# Patient Record
Sex: MALE | Race: WHITE | NOT HISPANIC OR LATINO | Employment: OTHER | URBAN - METROPOLITAN AREA
[De-identification: names, ages, dates, MRNs, and addresses within clinical notes are randomized per-mention and may not be internally consistent; named-entity substitution may affect disease eponyms.]

---

## 2017-09-07 ENCOUNTER — HOSPITAL ENCOUNTER (EMERGENCY)
Facility: CLINIC | Age: 62
Discharge: HOME | End: 2017-09-07
Attending: PHYSICIAN ASSISTANT | Admitting: PHYSICIAN ASSISTANT
Payer: COMMERCIAL

## 2017-09-07 ENCOUNTER — APPOINTMENT (OUTPATIENT)
Dept: GENERAL RADIOLOGY | Facility: CLINIC | Age: 62
End: 2017-09-07
Attending: PHYSICIAN ASSISTANT
Payer: COMMERCIAL

## 2017-09-07 VITALS
SYSTOLIC BLOOD PRESSURE: 114 MMHG | TEMPERATURE: 98.9 F | RESPIRATION RATE: 16 BRPM | HEART RATE: 64 BPM | OXYGEN SATURATION: 96 % | DIASTOLIC BLOOD PRESSURE: 62 MMHG

## 2017-09-07 DIAGNOSIS — S61.209A FINGER AVULSION, INITIAL ENCOUNTER: ICD-10-CM

## 2017-09-07 PROCEDURE — 64450 NJX AA&/STRD OTHER PN/BRANCH: CPT

## 2017-09-07 PROCEDURE — 99283 EMERGENCY DEPT VISIT LOW MDM: CPT | Mod: 25

## 2017-09-07 PROCEDURE — 25000125 ZZHC RX 250: Performed by: EMERGENCY MEDICINE

## 2017-09-07 PROCEDURE — 73140 X-RAY EXAM OF FINGER(S): CPT | Mod: LT

## 2017-09-07 RX ORDER — LIDOCAINE HYDROCHLORIDE 10 MG/ML
INJECTION, SOLUTION INFILTRATION; PERINEURAL
Status: DISCONTINUED
Start: 2017-09-07 | End: 2017-09-07 | Stop reason: HOSPADM

## 2017-09-07 RX ORDER — ONDANSETRON 4 MG/1
4 TABLET, ORALLY DISINTEGRATING ORAL ONCE
Status: COMPLETED | OUTPATIENT
Start: 2017-09-07 | End: 2017-09-07

## 2017-09-07 RX ADMIN — ONDANSETRON 4 MG: 4 TABLET, ORALLY DISINTEGRATING ORAL at 12:33

## 2017-09-07 ASSESSMENT — ENCOUNTER SYMPTOMS
LIGHT-HEADEDNESS: 0
FEVER: 0
NUMBNESS: 0
CHILLS: 0
WOUND: 1
MYALGIAS: 1

## 2017-09-07 NOTE — ED AVS SNAPSHOT
[facility] Emergency Department    [address]    [facility] [address]    Phone:  [phone]    Fax:  [phone]                                       Ruiz Ortiz   MRN: [id number]    Department:  [facility] Emergency Department   Date of Visit:  9/7/2017           After Visit Summary Signature Page     I have received my discharge instructions, and my questions have been answered. I have discussed any challenges I see with this plan with the nurse or doctor.    ..........................................................................................................................................  Patient/Patient Representative Signature      ..........................................................................................................................................  Patient Representative Print Name and Relationship to Patient    ..................................................               ................................................  Date                                            Time    ..........................................................................................................................................  Reviewed by Signature/Title    ...................................................              ..............................................  Date                                                            Time

## 2017-09-07 NOTE — ED PROVIDER NOTES
History     Chief Complaint:  Finger Laceration     HPI   Ruiz Ortiz is a 62 year old male who presents to the ED for evaluation of finger laceration. The patient reports he was using a table saw today when the blade cut the 3rd digit tip of his left hand. The patient notes he has pain but it is only a 4/10. The patient reports he took Tylenol. The patient denies any fevers, chills, lightheadedness, numbness, or tingling. Of note, the patient's last tetanus was on 4/21/17.    Allergies:  Vicodin     Medications:    Acetaminophen (TYLENOL PO)   IBUPROFEN PO   OXYCODONE HCL PO   Pantoprazole Sodium (PROTONIX PO)     Past Medical History:    Colon cancer    Past Surgical History:    Colon surgery  GI surgery    Family History:    History reviewed. No pertinent family history.     Social History:  Smoking status: Former smoker  Presents to ED alone  Marital Status:   [2]     Review of Systems   Constitutional: Negative for chills and fever.   Musculoskeletal: Positive for myalgias.   Skin: Positive for wound.   Neurological: Negative for light-headedness and numbness.   All other systems reviewed and are negative.    Physical Exam      Patient Vitals for the past 24 hrs:   Temp Temp src Heart Rate Resp SpO2   09/07/17 1214 98.9  F (37.2  C) Temporal 65 16 96 %     Physical Exam  Constitutional:  Alert, attentive  Cardiovascular:  2+ radial and ulnar pulses to the bilateral upper extremities   Neurological:  5/5 strength to the radian, ulnar and median motor distributions;      sensation intact to light touch to the radian, ulnar and median distributions  MSK:   There is a stellate avulsion to the palmar dorsal aspect of the left 3rd finger without involvement of the nail. Normal ROM of the finger.   Skin:    Skin is warm and dry.      Emergency Department Course     Imaging:  Radiographic findings were communicated with the patient who voiced understanding of the findings.    Fingers XR, 2-3 Views,  Left  IMPRESSION: No acute osseous abnormality. As read by Radiology.    Procedures:      Narrative: Procedure: Laceration Repair        LACERATION:  A stellate clean avulsion      LOCATION:  Tip of 3rd digit, left hand      FUNCTION:  Distally sensation, circulation and motor are intact.      ANESTHESIA:  Digital block using Lidocaine 1% total of 2 mLs      PREPARATION:  Irrigation and Scrubbing with Normal Saline and Shur Clens      DEBRIDEMENT:  no debridement      CLOSURE:  No closure necessary. The wound was an avulsion and gel foam was applied with tube gauze and optional finger splint provided by ED tech.      Interventions:  1233: Zofran 4mg Oral  Lidocaine 1% without epi    Emergency Department Course:  Past medical records, nursing notes, and vitals reviewed.  1214: I performed an exam of the patient and obtained history, as documented above.    The patient was sent for a left finger x-ray while in the emergency department, findings above.    1235: I performed the laceration repair procedure as noted above.    1258: I rechecked the patient. Findings and plan explained to the Patient. Patient discharged home with instructions regarding supportive care, medications, and reasons to return. The importance of close follow-up was reviewed.     Impression & Plan      Medical Decision Making:  Ruiz Ortiz is a 62 year old male who presents for evaluation of a laceration to the left hand 3rd digit.  The wound was carefully evaluated and explored after digital block. The patient felt nauseated during the digital block therefore was given Zofran and water. He felt improved shortly after. There is no evidence of muscular, tendon, or bony damage with this laceration.  No signs of foreign body. X-ray was obtained given electrical saw although wound didn't appear very deep and this was negative for bony abnormalities. Possible complications (infection, scarring) were reviewed with the patient.  Follow up with primary  care as noted in the discharge section.    Diagnosis:    ICD-10-CM   1. Finger avulsion, initial encounter S61.209A     Disposition: Patient discharged to home    Salena Ortiz  9/7/2017   [facility] EMERGENCY DEPARTMENT     I, Salena Ortiz, am serving as a scribe at 12:14 PM on 9/7/2017 to document services personally performed by Teri Terrazas PA-C based on my observations and the provider's statements to me.          Teri Terrazas PA-C  09/07/17 134

## 2017-09-07 NOTE — ED AVS SNAPSHOT
[facility] Emergency Department    [address] [facility] [address]    Phone:  [phone]    Fax:  [phone]                                       Ruiz Ortiz   MRN: [id number]    Department:  [facility] Emergency Department   Date of Visit:  9/7/2017           Patient Information     Date Of Birth          1955        Your diagnoses for this visit were:     Finger avulsion, initial encounter        You were seen by Teri Terrazas PA-C.      Follow-up Information     Follow up with Amrit Sanders In 2 days.    Specialty:  Internal Medicine    Why:  As needed    Contact information:    [facility]  [address] [place] [address]  [phone]          Follow up with [facility] Emergency Department.    Specialty:  EMERGENCY MEDICINE    Why:  If symptoms worsen    Contact information:    [address] [facility] [id number] [phone]        Discharge Instructions         Skin Tear (Skin Avulsion)  A skin avulsion is a tearing of the top layer of skin. This commonly happens after a fall or other injury. It also tends to be more common in older people, or those taking blood thinners or steroids for long periods of time.  Home care  These guidelines will help you care for your wound at home:    Keep the wound clean and dry for the first 24 to 48 hours, or as your healthcare provider advises.    If there is a dressing or bandage, change it when it gets wet or dirty. Otherwise, leave it on for the first 24 hours, then change it once a day or as often as the doctor says.    If stitches or staples were used, check the wound every day.    After taking off the dressing, wash the area gently with soap and water. Clean as close to the stitches as you can. Avoid washing or rubbing the stitches directly.    After 3 days you can keep the bandages off the wound, unless told otherwise, or there is  continued drainage.  Allow the wound to be open to the air.    Keep a thin layer of antibiotic ointment on the cut. This will keep the wound clean, make it easier to remove the stitches, and reduce scarring.    If your wound is oozing, you can put a nonstick dressing over it. Then, reapply the bandage or dressing as you were told.    You can shower as usual after the first 24 hours, but don't soak the area in water (no baths or swimming) until the stitches or staples are taken out.    If surgical tape was used, keep the area clean and dry. If it becomes wet, blot it dry with a clean towel.    If skin glue was used, don't put any creams, lotions, or antibiotic ointments on it. These can dissolve the glue. Usually the glue will flake off in about 5 to 10 days by itself. Try to resist picking it off before that so the wound doesn't open up. When it gets wet, pat it dry.  Here is some information about medicine:    You may use over-the-counter medicine such as acetaminophen or ibuprofen to control pain, unless another pain medicine was given. If you have chronic liver or kidney disease or ever had a stomach ulcer or gastrointestinal bleeding, talk with your doctor before using these medicines.    If you were given antibiotics, take them until they are all used up. It is important to finish the antibiotics even if the wound looks better. This will ensure that the infection has cleared.  Follow-up care  Follow up with your healthcare provider, or as advised.    Watch for any signs of infection, such as increasing redness, swelling, or pus coming out. If this happens, don't wait for your scheduled visit. Instead, see a doctor sooner.    Stitches or staples are usually taken out within 5 to 14 days. This varies depending on what part of your body they are on, and the type of wound. The doctor will tell you how long stitches should be left in.     If surgical tape was used, it is usually left on for 7 to 10 days. You can  remove surgical tape after that unless you were told otherwise. If you try to remove it, and it is too hard, soaking can help. Surgical tape strips will eventually fall off on their own. If the edges of the cut pull apart, stop removing the tape or strips and follow up with your doctor    As mentioned above, skin glue will flake off by itself in 5 to 10 days, so you don't need to pull it off.  If any X-rays were done, you will be notified of any changes that may affect your care.  When to seek medical advice  Call your healthcare provider right away if any of these occur:    Increasing pain in the wound    Redness, swelling, or pus coming from the wound    Fever of 100.4 F (38 C) or higher, or as directed by your healthcare provider    Sutures or staples come apart or fall out before your next appointment and the wound edges look as if they will re-open    Surgical tape closures fall off before 7 days, and the wound edges look as if they will re-open    Bleeding not controlled by direct pressure  Date Last Reviewed: 9/1/2016 2000-2017 [employer]. [address] [place] [address]. All rights reserved. This information is not intended as a substitute for professional medical care. Always follow your healthcare professional's instructions.          24 Hour Appointment Hotline       To make an appointment at any [facility], call [phone] ([phone]). If you don't have a family doctor or clinic, we will help you find one. [facility] are conveniently located to serve the needs of you and your family.             Review of your medicines      Our records show that you are taking the medicines listed below. If these are incorrect, please call your family doctor or clinic.        Dose / Directions Last dose taken    IBUPROFEN PO        Refills:  0        OXYCODONE HCL PO        Refills:  0        PROTONIX PO        Refills:  0        TYLENOL PO        Refills:  0                 Procedures and tests performed during your visit     Fingers XR, 2-3 views, left      Orders Needing Specimen Collection     None      Pending Results     Date and Time Order Name Status Description    9/7/2017 1223 Fingers XR, 2-3 views, left In process             Pending Culture Results     No orders found from 9/5/2017 to 9/8/2017.            Pending Results Instructions     If you had any lab results that were not finalized at the time of your Discharge, you can call the ED Lab Result RN at [phone]. You will be contacted by this team for any positive Lab results or changes in treatment. The nurses are available 7 days a week from 10A to 6:30P.  You can leave a message 24 hours per day and they will return your call.        Test Results From Your Hospital Stay        9/7/2017 12:50 PM      Result not yet available     Exam Ended                Clinical Quality Measure: Blood Pressure Screening     Your blood pressure was checked while you were in the emergency department today. The last reading we obtained was    . Please read the guidelines below about what these numbers mean and what you should do about them.  If your systolic blood pressure (the top number) is less than 120 and your diastolic blood pressure (the bottom number) is less than 80, then your blood pressure is normal. There is nothing more that you need to do about it.  If your systolic blood pressure (the top number) is 120-139 or your diastolic blood pressure (the bottom number) is 80-89, your blood pressure may be higher than it should be. You should have your blood pressure rechecked within a year by a primary care provider.  If your systolic blood pressure (the top number) is 140 or greater or your diastolic blood pressure (the bottom number) is 90 or greater, you may have high blood pressure. High blood pressure is treatable, but if left untreated over time it can put you at risk for heart attack, stroke, or kidney failure. You should  "have your blood pressure rechecked by a primary care provider within the next 4 weeks.  If your provider in the emergency department today gave you specific instructions to follow-up with your doctor or provider even sooner than that, you should follow that instruction and not wait for up to 4 weeks for your follow-up visit.        Thank you for choosing [facility]       Thank you for choosing [facility] for your care. Our goal is always to provide you with excellent care. Hearing back from our patients is one way we can continue to improve our services. Please take a few minutes to complete the written survey that you may receive in the mail after you visit with us. Thank you!        [employer] Information     [employer] lets you send messages to your doctor, view your test results, renew your prescriptions, schedule appointments and more. To sign up, go to [email or web address] . Click on \"Log in\" on the left side of the screen, which will take you to the Welcome page. Then click on \"Sign up Now\" on the right side of the page.     You will be asked to enter the access code listed below, as well as some personal information. Please follow the directions to create your username and password.     Your access code is: [email or web address]  Expires: 2017  1:01 PM     Your access code will  in 90 days. If you need help or a new code, please call your [facility] clinic or [phone].        Care EveryWhere ID     This is your Care EveryWhere ID. This could be used by other organizations to access your [facility] medical records  [id number]        Equal Access to Services     [facility] STEVEN : Hadii rakan elise hadasho Soomaali, waaxda luqadaha, qaybta kaalmada adehemayada, octavio cole. So [facility] [phone].    ATENCIÓN: Si habla español, tiene a sanches disposición servicios gratuitos de asistencia lingüística. Llame al [phone].    We comply with applicable federal civil rights laws and [place] laws. We " do not discriminate on the basis of race, color, national origin, age, disability sex, sexual orientation or gender identity.            After Visit Summary       This is your record. Keep this with you and show to your community pharmacist(s) and doctor(s) at your next visit.

## 2017-09-07 NOTE — ED NOTES
Laceration to the tip of the left middle finger while using a saw this am. Bleeding is controlled. CMS is intact. Last tetanus was in April.

## 2017-09-07 NOTE — DISCHARGE INSTRUCTIONS
Skin Tear (Skin Avulsion)  A skin avulsion is a tearing of the top layer of skin. This commonly happens after a fall or other injury. It also tends to be more common in older people, or those taking blood thinners or steroids for long periods of time.  Home care  These guidelines will help you care for your wound at home:    Keep the wound clean and dry for the first 24 to 48 hours, or as your healthcare provider advises.    If there is a dressing or bandage, change it when it gets wet or dirty. Otherwise, leave it on for the first 24 hours, then change it once a day or as often as the doctor says.    If stitches or staples were used, check the wound every day.    After taking off the dressing, wash the area gently with soap and water. Clean as close to the stitches as you can. Avoid washing or rubbing the stitches directly.    After 3 days you can keep the bandages off the wound, unless told otherwise, or there is continued drainage.  Allow the wound to be open to the air.    Keep a thin layer of antibiotic ointment on the cut. This will keep the wound clean, make it easier to remove the stitches, and reduce scarring.    If your wound is oozing, you can put a nonstick dressing over it. Then, reapply the bandage or dressing as you were told.    You can shower as usual after the first 24 hours, but don't soak the area in water (no baths or swimming) until the stitches or staples are taken out.    If surgical tape was used, keep the area clean and dry. If it becomes wet, blot it dry with a clean towel.    If skin glue was used, don't put any creams, lotions, or antibiotic ointments on it. These can dissolve the glue. Usually the glue will flake off in about 5 to 10 days by itself. Try to resist picking it off before that so the wound doesn't open up. When it gets wet, pat it dry.  Here is some information about medicine:    You may use over-the-counter medicine such as acetaminophen or ibuprofen to control pain,  unless another pain medicine was given. If you have chronic liver or kidney disease or ever had a stomach ulcer or gastrointestinal bleeding, talk with your doctor before using these medicines.    If you were given antibiotics, take them until they are all used up. It is important to finish the antibiotics even if the wound looks better. This will ensure that the infection has cleared.  Follow-up care  Follow up with your healthcare provider, or as advised.    Watch for any signs of infection, such as increasing redness, swelling, or pus coming out. If this happens, don't wait for your scheduled visit. Instead, see a doctor sooner.    Stitches or staples are usually taken out within 5 to 14 days. This varies depending on what part of your body they are on, and the type of wound. The doctor will tell you how long stitches should be left in.     If surgical tape was used, it is usually left on for 7 to 10 days. You can remove surgical tape after that unless you were told otherwise. If you try to remove it, and it is too hard, soaking can help. Surgical tape strips will eventually fall off on their own. If the edges of the cut pull apart, stop removing the tape or strips and follow up with your doctor    As mentioned above, skin glue will flake off by itself in 5 to 10 days, so you don't need to pull it off.  If any X-rays were done, you will be notified of any changes that may affect your care.  When to seek medical advice  Call your healthcare provider right away if any of these occur:    Increasing pain in the wound    Redness, swelling, or pus coming from the wound    Fever of 100.4 F (38 C) or higher, or as directed by your healthcare provider    Sutures or staples come apart or fall out before your next appointment and the wound edges look as if they will re-open    Surgical tape closures fall off before 7 days, and the wound edges look as if they will re-open    Bleeding not controlled by direct pressure  Date  Last Reviewed: 9/1/2016 2000-2017 [employer]. [address] [place] [address], [place] [address]. All rights reserved. This information is not intended as a substitute for professional medical care. Always follow your healthcare professional's instructions.

## 2021-04-20 ENCOUNTER — HOSPITAL ENCOUNTER (EMERGENCY)
Facility: CLINIC | Age: 66
Discharge: HOME | End: 2021-04-20
Attending: PHYSICIAN ASSISTANT | Admitting: PHYSICIAN ASSISTANT
Payer: MEDICARE

## 2021-04-20 VITALS
SYSTOLIC BLOOD PRESSURE: 136 MMHG | OXYGEN SATURATION: 96 % | HEART RATE: 57 BPM | TEMPERATURE: 97.4 F | DIASTOLIC BLOOD PRESSURE: 79 MMHG | RESPIRATION RATE: 18 BRPM

## 2021-04-20 DIAGNOSIS — R30.0 DYSURIA: ICD-10-CM

## 2021-04-20 DIAGNOSIS — R35.0 URINARY FREQUENCY: ICD-10-CM

## 2021-04-20 LAB
ALBUMIN UR-MCNC: NEGATIVE MG/DL
APPEARANCE UR: CLEAR
BILIRUB UR QL STRIP: NEGATIVE
COLOR UR AUTO: YELLOW
GLUCOSE UR STRIP-MCNC: NEGATIVE MG/DL
HGB UR QL STRIP: NEGATIVE
KETONES UR STRIP-MCNC: NEGATIVE MG/DL
LEUKOCYTE ESTERASE UR QL STRIP: NEGATIVE
MUCOUS THREADS #/AREA URNS LPF: PRESENT /LPF
NITRATE UR QL: NEGATIVE
PH UR STRIP: 5 PH (ref 5–7)
RBC #/AREA URNS AUTO: 0 /HPF (ref 0–2)
SOURCE: ABNORMAL
SP GR UR STRIP: 1.02 (ref 1–1.03)
UROBILINOGEN UR STRIP-MCNC: NORMAL MG/DL (ref 0–2)
WBC #/AREA URNS AUTO: <1 /HPF (ref 0–5)

## 2021-04-20 PROCEDURE — 87086 URINE CULTURE/COLONY COUNT: CPT | Performed by: PHYSICIAN ASSISTANT

## 2021-04-20 PROCEDURE — 99284 EMERGENCY DEPT VISIT MOD MDM: CPT | Mod: 25

## 2021-04-20 PROCEDURE — 51798 US URINE CAPACITY MEASURE: CPT

## 2021-04-20 PROCEDURE — 81001 URINALYSIS AUTO W/SCOPE: CPT | Performed by: EMERGENCY MEDICINE

## 2021-04-20 RX ORDER — PHENAZOPYRIDINE HYDROCHLORIDE 200 MG/1
200 TABLET, FILM COATED ORAL 3 TIMES DAILY
Qty: 9 TABLET | Refills: 0 | Status: SHIPPED | OUTPATIENT
Start: 2021-04-20 | End: 2021-04-23

## 2021-04-20 ASSESSMENT — ENCOUNTER SYMPTOMS
FREQUENCY: 1
COUGH: 0
DYSURIA: 1
CHILLS: 0
FEVER: 0
SHORTNESS OF BREATH: 0

## 2021-04-20 NOTE — ED TRIAGE NOTES
Patient presents to the ED reporting dysuria and frequency. States had a colonoscopy a few weeks ago and initially had some abdominal pain and diarrhea. Reports those symptoms have resolved, but now has frequency and dysuria.

## 2021-04-21 NOTE — DISCHARGE INSTRUCTIONS
As discussed, I would recommend that you follow-up with your primary care provider tomorrow as well as urologist for further evaluation of your presenting symptoms.  Urine culture is pending.  If positive you will be contacted.  Please return to the emergency department if you develop fever, severe abdominal discomfort, unable to urinate, or any other medical concerns.

## 2021-04-21 NOTE — ED PROVIDER NOTES
"  History   Chief Complaint:  Dysuria     HPI   Ruiz Ortiz is a 65 year old male with history of colon cancer, seizures, IBS, colon polyps, colectomy, appendectomy, and cholecystectomy who presents with dysuria and frequency for the past \"few\" days. The patient initially had some abdominal pain and diarrhea following a colonoscopy a few weeks ago, which has since resolved. He denies any history of prostate enlargement, fever, shortness of breath, chills, myalgias, hematuria, or cough. Of note his PCP is Dr. Amrit Sanders from [facility]. Patient denied any concern for STD.     Review of Systems   Constitutional: Negative for chills and fever.   Respiratory: Negative for cough and shortness of breath.    Genitourinary: Positive for dysuria and frequency.   All other systems reviewed and are negative.    Allergies:  Vicodin  Nepafenac    Medications:  Oxycodone  Protonix  Citalopram  Simvastatin  Orudis  Levothyroxine  Niacin  Senna  Imodium  Lexapro  Doxycycline Hyclate  Trazodone  Flonase  Lipitor    Past Medical History:    Allergic State  Blindness  Colon Cancer  Dyslipidemia  Dyspepsia  Photophobia  Pulmonary nodule  Depression  Colon Polyp  Fuch's Endothelial Dystrophy  Adenocarcinoma of Prostate  Melanoma of Situ of Scalp and Neck  Acute Mesenteric Ischemia  Obstructive Sleep Apnea (uses CPAP)  Enthesopathy  Convulsions  Seizures  IBS    Past Surgical History:    Cataract Removal  Corneal Transplant  Tendon Repair, right foot  Cholecystectomy  Hernia Repair  Appendectomy  Vasectomy  Laparotomy  Colectomy    Family History:    Father - Prostate Cancer, Heart Disease  Mother - Blindness, Breast Cancer, DVT/PE, Diabetes, Heart Disease, Hyperlipidemia  Sister - Hyperlipidemia, Cancer, Leukemia    Social History:  Arrives with wife  Former Smoker    Physical Exam     Patient Vitals for the past 24 hrs:   BP Temp Pulse Resp SpO2   04/20/21 2030 136/79 -- 57 -- 96 %   04/20/21 1621 133/78 " 97.4  F (36.3  C) 69 18 98 %       Physical Exam  Vitals signs and nursing note reviewed.   HENT:      Nose: Nose normal. No congestion or rhinorrhea.      Mouth/Throat:      Mouth: Mucous membranes are moist.   Cardiovascular:      Rate and Rhythm: Regular rhythm. Normal Rate.     Pulses: Normal pulses.      Heart sounds: Normal heart sounds.   Pulmonary:      Effort: Pulmonary effort is normal.      Breath sounds: Normal breath sounds.   Abdominal:      General: Abdomen is flat. Bowel sounds are normal.      Palpations: Abdomen is soft.      Tenderness: There is no abdominal tenderness. No CVA tenderness.   Musculoskeletal: Normal range of motion bilateral upper and lower extremities.  Patient observed ambulating in the ED without difficulty.  Skin:     General: Skin is warm and dry.   Neurological:      Mental Status: Alert.  Speech normal.  Responds appropriately to questions.  Psychiatric:         Mood and Affect: Mood normal.         Behavior: Behavior normal.     Emergency Department Course   Laboratory:  UA: Mucous Present (!), o/w Negative    Emergency Department Course:    Reviewed:  I reviewed nursing notes, vitals, past medical history and care everywhere    Assessments:  2039 I obtained history and examined the patient as noted above.   2103 I rechecked the patient and explained UA findings. Patient does not want any additional lab work done. We discussed discharge and patient verbally agreed.   2111 Patient set for discharge.    Disposition:  The patient was discharged to home.     Impression & Plan   Medical Decision Making:  Ruiz Ortiz is a 65-year-old male who presents to the emergency department for the evaluation of dysuria and urinary frequency.  See HPI for additional details.  Vitals are reviewed.  Patient afebrile and hemodynamically stable.  On exam, the patient had a completely benign abdominal exam without rebound, guarding, distention, or marked tenderness to palpation.  No CVA  tenderness.  A was obtained and was negative for acute infection or hematuria.  Urine culture pending.  Given history, bladder scan was obtained which revealed 197 mL.  Post void bladder demonstrated 25 mL.  Differential considered included urethritis, prostatitis, cystitis, epididymitis, urolithiasis, etc., all which seem unlikely in this clinical setting.  Given overall well appearance and unremarkable evaluation I felt safe discharging the patient home with outpatient recommendations.  We discussed the plan for close follow-up with his primary care provider/urologist for further evaluation.  I did discharge the patient home with a prescription for pyridium.  Strict return precautions were discussed.  All questions and concerns were addressed prior to discharge.    Covid-19  Ruiz Ortiz was evaluated during a global COVID-19 pandemic, which necessitated consideration that the patient might be at risk for infection with the SARS-CoV-2 virus that causes COVID-19.   Applicable protocols for evaluation were followed during the patient's care.   COVID-19 was considered as part of the patient's evaluation.     Diagnosis:    ICD-10-CM    1. Dysuria  R30.0 Urine Culture   2. Urinary frequency  R35.0        Discharge Medications:  Discharge Medication List as of 4/20/2021  9:14 PM      START taking these medications    Details   phenazopyridine (PYRIDIUM) 200 MG tablet Take 1 tablet (200 mg) by mouth 3 times daily for 3 days, Disp-9 tablet, R-0, Local Print             Scribe Disclosure:  Richelle CHANDRA, am serving as a scribe at 8:29 PM on 4/20/2021 to document services personally performed by Marquita Neal PA-C based on my observations and the provider's statements to me.     This note was completed in part using Dragon voice recognition software. Although reviewed after completion, some word and grammatical errors may occur.     Marquita Neal PA-C  04/20/21 8581

## 2021-04-22 LAB
BACTERIA SPEC CULT: NO GROWTH
Lab: NORMAL
SPECIMEN SOURCE: NORMAL

## 2021-04-22 NOTE — RESULT ENCOUNTER NOTE
"Final urine culture report shows \"NO GROWTH\" and is NEGATIVE.  Recommendations in treatment per [facility] ED Lab result Urine culture protocol.  "

## 2023-02-07 ENCOUNTER — APPOINTMENT (OUTPATIENT)
Dept: CT IMAGING | Facility: CLINIC | Age: 68
End: 2023-02-07
Attending: EMERGENCY MEDICINE
Payer: MEDICARE

## 2023-02-07 ENCOUNTER — HOSPITAL ENCOUNTER (EMERGENCY)
Facility: CLINIC | Age: 68
Discharge: HOME | End: 2023-02-07
Attending: EMERGENCY MEDICINE | Admitting: EMERGENCY MEDICINE
Payer: MEDICARE

## 2023-02-07 VITALS
OXYGEN SATURATION: 97 % | RESPIRATION RATE: 16 BRPM | DIASTOLIC BLOOD PRESSURE: 62 MMHG | TEMPERATURE: 99 F | HEART RATE: 68 BPM | SYSTOLIC BLOOD PRESSURE: 133 MMHG

## 2023-02-07 DIAGNOSIS — R19.7 VOMITING AND DIARRHEA: ICD-10-CM

## 2023-02-07 DIAGNOSIS — R91.8 PULMONARY NODULES: ICD-10-CM

## 2023-02-07 DIAGNOSIS — R11.10 VOMITING AND DIARRHEA: ICD-10-CM

## 2023-02-07 DIAGNOSIS — R10.84 ABDOMINAL PAIN, GENERALIZED: ICD-10-CM

## 2023-02-07 LAB
ALBUMIN SERPL BCG-MCNC: 5 G/DL (ref 3.5–5.2)
ALP SERPL-CCNC: 92 U/L (ref 40–129)
ALT SERPL W P-5'-P-CCNC: 43 U/L (ref 10–50)
ANION GAP SERPL CALCULATED.3IONS-SCNC: 17 MMOL/L (ref 7–15)
AST SERPL W P-5'-P-CCNC: 34 U/L (ref 10–50)
BASOPHILS # BLD AUTO: 0 10E3/UL (ref 0–0.2)
BASOPHILS NFR BLD AUTO: 0 %
BILIRUB SERPL-MCNC: 0.9 MG/DL
BUN SERPL-MCNC: 16.7 MG/DL (ref 8–23)
CALCIUM SERPL-MCNC: 9.6 MG/DL (ref 8.8–10.2)
CHLORIDE SERPL-SCNC: 105 MMOL/L (ref 98–107)
CREAT SERPL-MCNC: 1.15 MG/DL (ref 0.67–1.17)
DEPRECATED HCO3 PLAS-SCNC: 21 MMOL/L (ref 22–29)
EOSINOPHIL # BLD AUTO: 0 10E3/UL (ref 0–0.7)
EOSINOPHIL NFR BLD AUTO: 0 %
ERYTHROCYTE [DISTWIDTH] IN BLOOD BY AUTOMATED COUNT: 13 % (ref 10–15)
GFR SERPL CREATININE-BSD FRML MDRD: 69 ML/MIN/1.73M2
GLUCOSE SERPL-MCNC: 183 MG/DL (ref 70–99)
HCT VFR BLD AUTO: 48.9 % (ref 40–53)
HGB BLD-MCNC: 16.4 G/DL (ref 13.3–17.7)
HOLD SPECIMEN: NORMAL
HOLD SPECIMEN: NORMAL
IMM GRANULOCYTES # BLD: 0 10E3/UL
IMM GRANULOCYTES NFR BLD: 0 %
LIPASE SERPL-CCNC: 44 U/L (ref 13–60)
LYMPHOCYTES # BLD AUTO: 0.7 10E3/UL (ref 0.8–5.3)
LYMPHOCYTES NFR BLD AUTO: 4 %
MCH RBC QN AUTO: 30.4 PG (ref 26.5–33)
MCHC RBC AUTO-ENTMCNC: 33.5 G/DL (ref 31.5–36.5)
MCV RBC AUTO: 91 FL (ref 78–100)
MONOCYTES # BLD AUTO: 1 10E3/UL (ref 0–1.3)
MONOCYTES NFR BLD AUTO: 6 %
NEUTROPHILS # BLD AUTO: 15.6 10E3/UL (ref 1.6–8.3)
NEUTROPHILS NFR BLD AUTO: 90 %
NRBC # BLD AUTO: 0 10E3/UL
NRBC BLD AUTO-RTO: 0 /100
PLATELET # BLD AUTO: 268 10E3/UL (ref 150–450)
POTASSIUM SERPL-SCNC: 4.3 MMOL/L (ref 3.4–5.3)
PROT SERPL-MCNC: 7.5 G/DL (ref 6.4–8.3)
RBC # BLD AUTO: 5.39 10E6/UL (ref 4.4–5.9)
SODIUM SERPL-SCNC: 143 MMOL/L (ref 136–145)
WBC # BLD AUTO: 17.3 10E3/UL (ref 4–11)

## 2023-02-07 PROCEDURE — 36415 COLL VENOUS BLD VENIPUNCTURE: CPT | Performed by: EMERGENCY MEDICINE

## 2023-02-07 PROCEDURE — 96374 THER/PROPH/DIAG INJ IV PUSH: CPT | Mod: 59

## 2023-02-07 PROCEDURE — 74177 CT ABD & PELVIS W/CONTRAST: CPT | Mod: MG

## 2023-02-07 PROCEDURE — 250N000011 HC RX IP 250 OP 636: Performed by: EMERGENCY MEDICINE

## 2023-02-07 PROCEDURE — 258N000003 HC RX IP 258 OP 636: Performed by: EMERGENCY MEDICINE

## 2023-02-07 PROCEDURE — G1010 CDSM STANSON: HCPCS

## 2023-02-07 PROCEDURE — 85025 COMPLETE CBC W/AUTO DIFF WBC: CPT | Performed by: EMERGENCY MEDICINE

## 2023-02-07 PROCEDURE — 96361 HYDRATE IV INFUSION ADD-ON: CPT

## 2023-02-07 PROCEDURE — 80053 COMPREHEN METABOLIC PANEL: CPT | Performed by: EMERGENCY MEDICINE

## 2023-02-07 PROCEDURE — 83690 ASSAY OF LIPASE: CPT | Performed by: EMERGENCY MEDICINE

## 2023-02-07 PROCEDURE — 99285 EMERGENCY DEPT VISIT HI MDM: CPT | Mod: 25

## 2023-02-07 RX ORDER — IOPAMIDOL 755 MG/ML
500 INJECTION, SOLUTION INTRAVASCULAR ONCE
Status: COMPLETED | OUTPATIENT
Start: 2023-02-07 | End: 2023-02-07

## 2023-02-07 RX ORDER — ONDANSETRON 2 MG/ML
4 INJECTION INTRAMUSCULAR; INTRAVENOUS ONCE
Status: COMPLETED | OUTPATIENT
Start: 2023-02-07 | End: 2023-02-07

## 2023-02-07 RX ORDER — ONDANSETRON 4 MG/1
4 TABLET, ORALLY DISINTEGRATING ORAL EVERY 8 HOURS PRN
Qty: 10 TABLET | Refills: 0 | Status: SHIPPED | OUTPATIENT
Start: 2023-02-07 | End: 2023-02-10

## 2023-02-07 RX ADMIN — ONDANSETRON 4 MG: 2 INJECTION INTRAMUSCULAR; INTRAVENOUS at 03:45

## 2023-02-07 RX ADMIN — IOPAMIDOL 85 ML: 755 INJECTION, SOLUTION INTRAVENOUS at 05:51

## 2023-02-07 RX ADMIN — SODIUM CHLORIDE 1000 ML: 9 INJECTION, SOLUTION INTRAVENOUS at 03:52

## 2023-02-07 ASSESSMENT — ENCOUNTER SYMPTOMS
SHORTNESS OF BREATH: 0
DYSURIA: 0
DIARRHEA: 1
VOMITING: 1
NAUSEA: 1
ABDOMINAL PAIN: 1
ABDOMINAL DISTENTION: 1

## 2023-02-07 ASSESSMENT — ACTIVITIES OF DAILY LIVING (ADL)
ADLS_ACUITY_SCORE: 35
ADLS_ACUITY_SCORE: 33

## 2023-02-07 NOTE — ED PROVIDER NOTES
History     Chief Complaint:  Nausea & Vomiting       HPI   Ruiz Ortiz is a 67 year old male with a history of colon cancer who presents with vomiting. The patient states that he has had some abdominal pain for the past week. After eating at a  he states that he began vomiting. He has had 9 episodes of vomiting and 6 episodes of diarrhea. He also feels distended. No oral antibiotics but does use antibiotic eye ointment. No chest pain or shortness of breath. He denies bloody stool and dysuria. Is colon was removed in 2016 and it was a full removal. He denies travel outside the country. He feels better after the IV fluids in the ED.      Independent Historian:   None - Patient Only      ROS:  Review of Systems   Respiratory: Negative for shortness of breath.    Cardiovascular: Negative for chest pain.   Gastrointestinal: Positive for abdominal distention, abdominal pain, diarrhea, nausea and vomiting.   Genitourinary: Negative for dysuria.   All other systems reviewed and are negative.      Allergies:  Vicodin [Hydrocodone-Acetaminophen]     Medications:    OXYCODONE   Pantoprazole Sodium  atorvastatin    Past Medical History:  Colon cancer    Prediabetes  Inguinal hernia  BALTAZAR  Hyperlipidemia  Dyspepsia  Seizures  Pulmonary nodules    Past Surgical History:    Colon surgery  Eye surgery   Hernia surgery  Vasectomy  Prostate surgery  Foot surgery    Family History:    Father-Prostate cancer, heart disease  Mother-Breast cancer, DVT, diabetes, heart disease  Sister-Leukemia    Social History:   reports that he has quit smoking. He does not have any smokeless tobacco history on file.  PCP: Amrit Sanders     Physical Exam     Patient Vitals for the past 24 hrs:   BP Temp Temp src Pulse Resp SpO2   23 0317 (!) 145/70 99  F (37.2  C) Temporal 82 18 98 %        Physical Exam    Gen: alert  HEENT: no acute abnl  Neck: normal ROM  CV: RRR, no murmurs  Pulm: breath sounds equal, lungs clear  Abd: Soft,  diffuse tenderness of the abdomen, normal bowel sounds  Back: no evidence of injury, no cva tenderness  MSK: no deformity, moves all extremities  Skin: no rash  Neuro: alert, appropriate conversation and interaction    Emergency Department Course       Imaging:  CT Abdomen Pelvis w Contrast   Final Result   IMPRESSION:    1.  No definite acute findings in the abdomen or pelvis to explain the patient's symptoms.      2.  Postoperative changes subtotal colectomy. No evidence for bowel obstruction or acute bowel findings.      3.  Cholecystectomy. No biliary dilatation.      4.  Prostatic seed implants in place. No adenopathy.      5.  Several small technically indeterminate pulmonary nodules in the right lower lung measuring up to 4 mm. Given the patient's history of colon cancer, follow-up chest CT in 1 year to monitor for stability is suggested.                                          Report per radiology    Laboratory:  Labs Ordered and Resulted from Time of ED Arrival to Time of ED Departure   COMPREHENSIVE METABOLIC PANEL - Abnormal       Result Value    Sodium 143      Potassium 4.3      Chloride 105      Carbon Dioxide (CO2) 21 (*)     Anion Gap 17 (*)     Urea Nitrogen 16.7      Creatinine 1.15      Calcium 9.6      Glucose 183 (*)     Alkaline Phosphatase 92      AST 34      ALT 43      Protein Total 7.5      Albumin 5.0      Bilirubin Total 0.9      GFR Estimate 69     CBC WITH PLATELETS AND DIFFERENTIAL - Abnormal    WBC Count 17.3 (*)     RBC Count 5.39      Hemoglobin 16.4      Hematocrit 48.9      MCV 91      MCH 30.4      MCHC 33.5      RDW 13.0      Platelet Count 268      % Neutrophils 90      % Lymphocytes 4      % Monocytes 6      % Eosinophils 0      % Basophils 0      % Immature Granulocytes 0      NRBCs per 100 WBC 0      Absolute Neutrophils 15.6 (*)     Absolute Lymphocytes 0.7 (*)     Absolute Monocytes 1.0      Absolute Eosinophils 0.0      Absolute Basophils 0.0      Absolute Immature  Granulocytes 0.0      Absolute NRBCs 0.0     LIPASE - Normal    Lipase 44            Emergency Department Course & Assessments:    Interventions:  Medications   ondansetron (ZOFRAN) injection 4 mg (4 mg Intravenous Given 2/7/23 4306)   0.9% sodium chloride BOLUS (1,000 mLs Intravenous New Bag 2/7/23 3677)        Independent Interpretation (X-rays, CTs, rhythm strip):  Reviewed CT    Assessments:  0505 Obtained the patients history and performed initial exam    Disposition:  The patient was discharged to home.     Impression & Plan        Medical Decision Making:  Ruiz Ortiz is a 67 year old male who presents for evaluation of nausea, vomiting and diarrhea with mild abdominal pain in a nonfocal abdominal exam.  There are no ill contacts.  I considered a broad differential diagnosis for this patient including viral gastroenteritis, bacterial infection of the large intestine (salmonella, shigella, campylobacter, e coli, etc), bowel obstruction, intra-abdominal infection such as colitis, food poisoning, cholecystitis, pyelonephritis, appendicitis, etc.  There are no signs of worrisome intra-abdominal pathologies detected during the visit today.  He has a completely benign abdominal exam without rebound, guarding, or marked tenderness to palpation.  Supportive outpatient management is therefore indicated.  Abdominal pain precautions are given for home.   No indication for stool studies at this time.  CT negative for acute pathology.  Incidental pulmonary nodules discussed and discussed need for follow up CT.  He passed oral challenge here in ED. It was discussed to return to the ED for blood in stool, increasing pain, or fevers.   Feels much improved after interventions in ED.         Diagnosis:    ICD-10-CM    1. Pulmonary nodules  R91.8     follow up chest CT recommended      2. Abdominal pain, generalized  R10.84       3. Vomiting and diarrhea  R11.10     R19.7            Discharge Medications:  New Prescriptions     ONDANSETRON (ZOFRAN ODT) 4 MG ODT TAB    Take 1 tablet (4 mg) by mouth every 8 hours as needed for nausea          Scribe Disclosure:  I, Jayden Mays, am serving as a scribe at 5:15 AM on 2/7/2023 to document services personally performed by Sarika Cheema MD based on my observations and the provider's statements to me.     2/7/2023   Sarika Cheema MD Trussell, Kristi Jo Schneider, MD  02/07/23 0647

## 2023-02-07 NOTE — ED TRIAGE NOTES
Here for n/v/d started last night associated with mild abdominal pain. Stated history of couple hernia surgeries. Took imodium about 1 hour ago. ABCs intact.      Triage Assessment     Row Name 02/07/23 0317       Triage Assessment (Adult)    Airway WDL WDL       Respiratory WDL    Respiratory WDL WDL       Cardiac WDL    Cardiac WDL WDL